# Patient Record
Sex: MALE | Race: WHITE | URBAN - METROPOLITAN AREA
[De-identification: names, ages, dates, MRNs, and addresses within clinical notes are randomized per-mention and may not be internally consistent; named-entity substitution may affect disease eponyms.]

---

## 2021-02-01 NOTE — PROGRESS NOTES
"ASSESSMENT AND PLAN:     COUNSELING:   Reviewed preventive health counseling, as reflected in patient instructions       Regular exercise       Healthy diet/nutrition       Consider Hep C screening for all patients one time for ages 18-79 years    (Z00.00) Visit for well [place] health check  (primary encounter diagnosis)  Comment: Age appropriate screening and counseling provided as above.   Plan: Hepatitis C antibody, Hemoglobin A1c ([place]), Lipid Profile, CANCELED: Lipid Panel         ([facility])          (F41.9) Anxiety  Comment: Intermittently symptomatic with anxiety. Historically well controlled with PRN propranolol for physiologic manifestations of anxiety. Will refill this. Reviewed potential side effects of beta blockade.   Plan: propranolol (INDERAL) 20 MG tablet          (Z82.49) Family history of premature coronary heart disease  Comment: Extensive family history of vascular disease including premature vascular disease in both parents and one grandparent. Checking Lp(a). If elevated or if LDL >160, would plan for preventive cardiology referral. Otherwise, plan to continue healthy lifestyle to 40 and then consider advanced testing like CAC scoring.   Plan: Lipoprotein (a),          Reggie Shelley MD  [facility]  02/04/2021, 9:20 AM      SUBJECTIVE:   Aram is a 30 year old male who presents to clinic today to establish care and for annual wellness exam.    # Vitamin D Deficiency  - diagnosed in grad school  - had repeat which was normal    # Thyroid Disorder  - told labs were borderline in grad school, unsure if hypo/hyper  - 9/18/19 TSH 2.07    # Anxiety   - more \"acute anxiety\" than \"generalized anxiety\"  - doesn't get panic attacks but has large fluctuations in degree of anxiety  - has seen psychiatry in the past and tried a couple different SSRIs   - has had propranolol as needed in the past which helped \"lower his baseline\" anxiety and found it effective  - tolerated propranolol " well without side effects  - would take 1-2 times a week during more stressful periods of work    - history of ADD, was previously on Strattera in grad school, stopped when he graduated 2018    # Acid Reflux  - comes and goes depending on diet and stress  - takes Tums if symptomatic  - sometimes Prevacid OTC with flare ups  - no difficulty swallowing, no bloody or melanotic stools  - burning pain radiating into chest, belching, gas pains  - has had endoscopy in the past which shows changes of acid reflux but no clear diagnosis  - has lost 40lbs since grad school     # Health Maintenance  - HIV Screenin2018 patient reported, negative  - Hep C Screening: do today  - BP:   BP Readings from Last 3 Encounters:   21 129/88   - Cholesterol: do today, never done before  - Diabetes Screening: do today  - Exercise: a couple of weeks ago restarted running 3-5 miles (25-40 minutes), 5 days a week. Last Spring to September was running this often. Doing high-intensity core exercise as well    Today's PHQ-2 Score:   PHQ-2 (  [employer]) 2021   Q1: Little interest or pleasure in doing things 0   Q2: Feeling down, depressed or hopeless 1   PHQ-2 Score 1     Review of Systems:   Constitutional - no fevers, chills, night sweats, unintentional weight loss/gain   Eyes - no vision concerns   Ears/Nose/Throat - no hearing concerns, no dysphagia/odynophagia   Cardiovascular - no chest pain, palpitations   Pulmonary - no shortness of breath, wheezing, coughing   GI - no abdominal pain, constipation, diarrhea, nausea, vomiting    - no dysuria, polyuria, hematuria   Musculoskeletal - no joint or muscle pain  Integument - no rash   Neuro - no weakness, numbness, paresthesia   Heme - no easy bruising/bleeding   Endocrine - no polyuria, weight loss/gain, dry skin, excessive sweating, hair loss   Psychiatric - as above   Allergic/Immunologic - no history of anaphylaxis, no history of recurrent infections    Past  "Medical History:   Diagnosis Date     Anxiety      Attention deficit hyperactivity disorder (ADHD), predominantly inattentive type      History reviewed. No pertinent surgical history.  Family History   Problem Relation Age of Onset     Cerebrovascular Disease Mother 48     Hypertension Mother      Coronary Artery Disease Father 51     Atrial fibrillation Father 54     Hyperlipidemia Father      Hypertension Father      Asthma Sister      Thyroid Disease Sister      Kidney Cancer Brother         as a child     Congenital heart disease Brother         septal defect (unsure ASD/VSD)     Thyroid Disease Maternal Grandmother      Ovarian Cancer Maternal Grandmother      Coronary Artery Disease Maternal Grandfather 72        smoked     Diabetes Maternal Grandfather      Coronary Artery Disease Paternal Grandfather 55        smoked     Diabetes Paternal Grandfather      Coronary Artery Disease Paternal Uncle 68        smokes     Prostate Cancer No family hx of      Colon Cancer No family hx of      Social History     Tobacco Use     Smoking status: Never Smoker     Smokeless tobacco: Never Used   Substance Use Topics     Alcohol use: Yes     Frequency: 2-4 times a month     Drinks per session: 3 or 4     Binge frequency: Less than monthly     Comment: 2-3 drinks 1-2 times a week     Drug use: Not Currently     Social History     Social History Narrative    Lives with partner who is traveling nurse    Does quantitative analysis for [employer]    3 cats       Current Outpatient Medications   Medication     LANsoprazole (PREVACID) 15 MG DR capsule     propranolol (INDERAL) 20 MG tablet     No current facility-administered medications for this visit.      I have reviewed the patient's past medical, surgical, family, and social history.     OBJECTIVE:   /88   Pulse 81   Temp 98.2  F (36.8  C) (Temporal)   Resp 18   Ht 1.79 m (5' 10.47\")   Wt 84.4 kg (186 lb)   SpO2 95%   BMI 26.33 kg/m      Constitutional: " well-appearing, appears stated age  Eyes: conjunctivae without erythema, sclera anicteric. Pupils equal, round, and reactive to light.   ENT: oropharynx clear, TM grey bilateral  Cardiac: regular rate and rhythm, normal S1/S2, no murmur/rubs/gallops  Respiratory: lungs clear to auscultation bilaterally, normal work of breathing, no wheezes/crackles  GI: abdomen soft, non-tender, non-distended  Extremities: warm and well perfused, radial pulses 2+ and equal, cap refill brisk.  Lymph: no cervical or supraclavicular lymphadenopathy  Skin: no rashes, lesions, or wounds  Psych: affect is full and appropriate, speech is fluent and non-pressured

## 2021-02-02 ENCOUNTER — OFFICE VISIT (OUTPATIENT)
Dept: FAMILY MEDICINE | Facility: CLINIC | Age: 31
End: 2021-02-02
Payer: COMMERCIAL

## 2021-02-02 VITALS
HEART RATE: 81 BPM | OXYGEN SATURATION: 95 % | TEMPERATURE: 98.2 F | BODY MASS INDEX: 26.63 KG/M2 | DIASTOLIC BLOOD PRESSURE: 88 MMHG | SYSTOLIC BLOOD PRESSURE: 129 MMHG | WEIGHT: 186 LBS | RESPIRATION RATE: 18 BRPM | HEIGHT: 70 IN

## 2021-02-02 DIAGNOSIS — Z82.49 FAMILY HISTORY OF PREMATURE CORONARY HEART DISEASE: ICD-10-CM

## 2021-02-02 DIAGNOSIS — Z00.00 VISIT FOR WELL MAN HEALTH CHECK: Primary | ICD-10-CM

## 2021-02-02 DIAGNOSIS — F41.9 ANXIETY: ICD-10-CM

## 2021-02-02 LAB
CHOLEST SERPL-MCNC: 139 MG/DL
HBA1C MFR BLD: 5.2 % (ref 4.1–5.7)
HCV AB SERPL QL IA: NONREACTIVE
HDLC SERPL-MCNC: 56 MG/DL
LDLC SERPL CALC-MCNC: 66 MG/DL
NONHDLC SERPL-MCNC: 83 MG/DL
TRIGL SERPL-MCNC: 85 MG/DL

## 2021-02-02 RX ORDER — PROPRANOLOL HYDROCHLORIDE 20 MG/1
TABLET ORAL
COMMUNITY
Start: 2019-09-18 | End: 2021-02-02

## 2021-02-02 RX ORDER — MECOBALAMIN 5000 MCG
15 TABLET,DISINTEGRATING ORAL DAILY
COMMUNITY
End: 2022-10-22

## 2021-02-02 RX ORDER — PROPRANOLOL HYDROCHLORIDE 20 MG/1
TABLET ORAL
Qty: 30 TABLET | Refills: 3 | Status: SHIPPED | OUTPATIENT
Start: 2021-02-02

## 2021-02-02 SDOH — HEALTH STABILITY: MENTAL HEALTH: HOW OFTEN DO YOU HAVE 6 OR MORE DRINKS ON ONE OCCASION?: LESS THAN MONTHLY

## 2021-02-02 SDOH — HEALTH STABILITY: MENTAL HEALTH: HOW MANY STANDARD DRINKS CONTAINING ALCOHOL DO YOU HAVE ON A TYPICAL DAY?: 3 OR 4

## 2021-02-02 SDOH — HEALTH STABILITY: MENTAL HEALTH: HOW OFTEN DO YOU HAVE A DRINK CONTAINING ALCOHOL?: 2-4 TIMES A MONTH

## 2021-02-02 ASSESSMENT — MIFFLIN-ST. JEOR: SCORE: 1807.43

## 2021-02-02 NOTE — NURSING NOTE
"30 year old  Chief Complaint   Patient presents with     Physical     30 yrs old non fasting        Blood pressure (!) 132/92, pulse 81, temperature 98.2  F (36.8  C), temperature source Temporal, resp. rate 18, height 1.79 m (5' 10.47\"), weight 84.4 kg (186 lb), SpO2 95 %. Body mass index is 26.33 kg/m .  There is no problem list on file for this patient.      Wt Readings from Last 2 Encounters:   02/02/21 84.4 kg (186 lb)     BP Readings from Last 3 Encounters:   02/02/21 (!) 132/92         Current Outpatient Medications   Medication     propranolol (INDERAL) 20 MG tablet     No current facility-administered medications for this visit.        Social History     Tobacco Use     Smoking status: Never Smoker     Smokeless tobacco: Never Used   Substance Use Topics     Alcohol use: Yes     Frequency: Monthly or less     Drug use: Not Currently       Health Maintenance Due   Topic Date Due     PREVENTIVE CARE VISIT  1988     HIV SCREENING  12/07/2003     HEPATITIS C SCREENING  12/07/2006     PHQ-2  01/01/2021       No results found for: PAP      February 2, 2021 4:01 PM  "

## 2021-02-04 LAB — LPA SERPL-MCNC: <6 MG/DL

## 2021-03-22 ENCOUNTER — OFFICE VISIT (OUTPATIENT)
Dept: FAMILY MEDICINE | Facility: CLINIC | Age: 31
End: 2021-03-22
Payer: COMMERCIAL

## 2021-03-22 VITALS
WEIGHT: 190.8 LBS | SYSTOLIC BLOOD PRESSURE: 141 MMHG | TEMPERATURE: 97.1 F | OXYGEN SATURATION: 98 % | RESPIRATION RATE: 15 BRPM | HEART RATE: 65 BPM | DIASTOLIC BLOOD PRESSURE: 91 MMHG | HEIGHT: 70 IN | BODY MASS INDEX: 27.32 KG/M2

## 2021-03-22 DIAGNOSIS — M25.562 LEFT KNEE PAIN, UNSPECIFIED CHRONICITY: Primary | ICD-10-CM

## 2021-03-22 DIAGNOSIS — M25.562 LEFT KNEE PAIN, UNSPECIFIED CHRONICITY: ICD-10-CM

## 2021-03-22 ASSESSMENT — PAIN SCALES - GENERAL: PAINLEVEL: MILD PAIN (3)

## 2021-03-22 ASSESSMENT — MIFFLIN-ST. JEOR: SCORE: 1829.2

## 2021-03-22 NOTE — NURSING NOTE
"30 year old  Chief Complaint   Patient presents with     Knee Pain     left knee pain. Pt injuried his knee about a month ago, his knee was swollen for a few weeks. Pt started running last week and is having issues.       Blood pressure (!) 141/91, pulse 65, temperature 97.1  F (36.2  C), resp. rate 15, height 1.79 m (5' 10.47\"), weight 86.5 kg (190 lb 12.8 oz), SpO2 98 %. Body mass index is 27.01 kg/m .  Patient Active Problem List   Diagnosis     Anxiety     Family history of premature coronary heart disease       Wt Readings from Last 2 Encounters:   03/22/21 86.5 kg (190 lb 12.8 oz)   02/02/21 84.4 kg (186 lb)     BP Readings from Last 3 Encounters:   03/22/21 (!) 141/91   02/02/21 129/88         Current Outpatient Medications   Medication     LANsoprazole (PREVACID) 15 MG DR capsule     propranolol (INDERAL) 20 MG tablet     No current facility-administered medications for this visit.        Social History     Tobacco Use     Smoking status: Never Smoker     Smokeless tobacco: Never Used   Substance Use Topics     Alcohol use: Yes     Frequency: 2-4 times a month     Drinks per session: 3 or 4     Binge frequency: Less than monthly     Comment: 2-3 drinks 1-2 times a week     Drug use: Not Currently       Health Maintenance Due   Topic Date Due     ADVANCE CARE PLANNING  Never done       No results found for: PAP      March 22, 2021 2:18 PM  "

## 2021-03-22 NOTE — PATIENT INSTRUCTIONS
ASSESSMENT/PLAN:    LEFT knee pain, atypical injury and symptoms. Likely non-specific strain. Small chance of ACL tear  1. Reviewed x-rays. Await official radiology interpretation  2. Discussed options. Elected to start with P.T.   3. If no improvement after a few sessions, next step may be further imaging  4. Feel free to contact me as needed.    5. Also, work on hamstring and hip abductor strength    --Landon Cox MD

## 2021-03-22 NOTE — PROGRESS NOTES
OVERVIEW: Aram Hadley is a 30 year old male who presents to [facility] today for Knee Pain (left knee pain. Pt injuried his knee about a month ago, his knee was swollen for a few weeks. Pt started running last week and is having issues.)        ASSESSMENT/PLAN:    LEFT knee pain, atypical injury and symptoms. Likely non-specific strain. Small chance of ACL tear  1. Reviewed x-rays. Await official radiology interpretation  2. Discussed options. Elected to start with P.T.   3. If no improvement after a few sessions, next step may be further imaging  4. Feel free to contact me as needed.    5. Also, work on hamstring and hip abductor strength    --Landon Cox MD      SUBJECTIVE:      LEFT knee pain for past 5 weeks. Was doing body weights and running. During squats or lunges, felt hyperextension. Had pain and swelling. Swelling began about 2 hours after the hyperextension.     Rested and swelling resolved after about 4 weeks. Now, getting pains in his quad and hip. Knee feels weak and bruised. Feels some instability.      He had gained weight in grad school and then lost weight with core strengthening and running.     Review Of Systems:    Has otherwise been in usual state of health, e.g.   Cardiovascular: negative  Respiratory: No shortness of breath, dyspnea on exertion, cough, or hemoptysis  Gastrointestinal: negative  Genitourinary: negative    Problem list per EMR:  Patient Active Problem List   Diagnosis     Anxiety     Family history of premature coronary heart disease       Current Outpatient Medications   Medication Sig Dispense Refill     LANsoprazole (PREVACID) 15 MG DR capsule Take 15 mg by mouth daily       propranolol (INDERAL) 20 MG tablet Take one tablet by mouth as needed for anxiety. May repeat once in a day. 30 tablet 3       No Known Allergies     Social:   From [place], then [place], [place] and then [place] in Jan 2019 with job at [employer]. Now works for [employer].  "Trained in Quantitative Economics at [employer]        OBJECTIVE    Vitals: BP (!) 141/91   Pulse 65   Temp 97.1  F (36.2  C)   Resp 15   Ht 1.79 m (5' 10.47\")   Wt 86.5 kg (190 lb 12.8 oz)   SpO2 98%   BMI 27.01 kg/m    BMI= Body mass index is 27.01 kg/m .  Very pleasant and physically fit 30-year-old in no distress.  His gait appears normal.    Left knee has no redness warmth or effusion.  2 areas of tenderness: The first being at the medial patello-femoral facet without any apprehension with patellar mobility.  The second is in the lateral joint line posteriorly.    He can actively extend his knee fully and flex to approximately 135 degrees.    Lachman's test is equivocal.  He has a large leg and the exam is somewhat difficult.  There appears to be about 5 mm of excessive laxity as compared to the unaffected side, with an occasionally felt endpoint.  No kurt instability.  MCL LCL and PCL appear intact.  Hip has full pain-free range of motion.    X-rays 3 views left knee:    No bony abnormalities noted.  Normal joint spaces.    SEE TOP OF NOTE FOR ASSESSMENT AND PLAN    --Landon Cox MD  [facility], Department of Family Medicine and Community Health  "

## 2021-03-29 ENCOUNTER — THERAPY VISIT (OUTPATIENT)
Dept: PHYSICAL THERAPY | Facility: CLINIC | Age: 31
End: 2021-03-29
Payer: COMMERCIAL

## 2021-03-29 DIAGNOSIS — M25.562 LEFT KNEE PAIN, UNSPECIFIED CHRONICITY: ICD-10-CM

## 2021-03-29 PROCEDURE — 97161 PT EVAL LOW COMPLEX 20 MIN: CPT | Mod: GP | Performed by: PHYSICAL THERAPIST

## 2021-03-29 PROCEDURE — 97110 THERAPEUTIC EXERCISES: CPT | Mod: GP | Performed by: PHYSICAL THERAPIST

## 2021-03-29 PROCEDURE — 97530 THERAPEUTIC ACTIVITIES: CPT | Mod: GP | Performed by: PHYSICAL THERAPIST

## 2021-03-29 ASSESSMENT — ACTIVITIES OF DAILY LIVING (ADL)
HOW_WOULD_YOU_RATE_THE_CURRENT_FUNCTION_OF_YOUR_KNEE_DURING_YOUR_USUAL_DAILY_ACTIVITIES_ON_A_SCALE_FROM_0_TO_100_WITH_100_BEING_YOUR_LEVEL_OF_KNEE_FUNCTION_PRIOR_TO_YOUR_INJURY_AND_0_BEING_THE_INABILITY_TO_PERFORM_ANY_OF_YOUR_USUAL_DAILY_ACTIVITIES?: 75
AS_A_RESULT_OF_YOUR_KNEE_INJURY,_HOW_WOULD_YOU_RATE_YOUR_CURRENT_LEVEL_OF_DAILY_ACTIVITY?: ABNORMAL
STAND: ACTIVITY IS NOT DIFFICULT
WALK: ACTIVITY IS MINIMALLY DIFFICULT
KNEEL ON THE FRONT OF YOUR KNEE: ACTIVITY IS SOMEWHAT DIFFICULT
LIMPING: I DO NOT HAVE THE SYMPTOM
STIFFNESS: THE SYMPTOM AFFECTS MY ACTIVITY SLIGHTLY
SWELLING: NOT ANSWERED
GO DOWN STAIRS: ACTIVITY IS MINIMALLY DIFFICULT
KNEE_ACTIVITY_OF_DAILY_LIVING_SUM: 41
HOW_WOULD_YOU_RATE_THE_OVERALL_FUNCTION_OF_YOUR_KNEE_DURING_YOUR_USUAL_DAILY_ACTIVITIES?: NEARLY NORMAL
SIT WITH YOUR KNEE BENT: NOT ANSWERED
RISE FROM A CHAIR: ACTIVITY IS MINIMALLY DIFFICULT
GO UP STAIRS: ACTIVITY IS MINIMALLY DIFFICULT
SQUAT: ACTIVITY IS SOMEWHAT DIFFICULT
GIVING WAY, BUCKLING OR SHIFTING OF KNEE: THE SYMPTOM AFFECTS MY ACTIVITY MODERATELY
PAIN: THE SYMPTOM AFFECTS MY ACTIVITY SLIGHTLY
WEAKNESS: THE SYMPTOM AFFECTS MY ACTIVITY MODERATELY

## 2021-03-29 NOTE — PROGRESS NOTES
Physical Therapy Initial Evaluation  Subjective:  The history is provided by the patient. No  was used.   Therapist Generated HPI Evaluation  Problem details: HPI: Hyperextended knee during core workout, did ice and taping, but still feels unstable and feels like muscles are affected. Has not been able to resume running. Did have swelling, this has decreased    Onset: 6 weeks ago    Pain: worst at 4/10 in knee, groin/hip up to 6/10    Worse: squatting, kneeling, running, lunges, instability going up stairs    Better: taping, advil, icing    PMH: none    Exercise: none currently; has done some walking    Occupation: -[employer] job    Goals: return to activity, gain strength    .         Type of problem:  Left knee.    This is a new condition.  Condition occurred with:  Other reason (hyperextension during workout).  Where condition occurred: during recreation/sport.  Patient reports pain:  Medial.  and is intermittent.  Pain radiates to:  Hip and other (groin).   Since onset symptoms are gradually improving.  Associated symptoms:  Buckling/giving out, loss of strength and edema. Symptoms are exacerbated by ascending stairs, kneeling, running and bending/squatting  and relieved by ice, NSAID's and bracing/immobilizing.  Special tests included:  X-ray (negative).    Restrictions due to condition include:  Working in normal job without restrictions.  Barriers include:  None as reported by patient.                        Objective:    Gait:      Deviations:  Knee:  Excessive extension L and excessive extension RGeneral Deviations:  Toe out R and toe out L                                                 Hip Evaluation    Hip Strength:    Flexion:   Left: 4+/5   Pain:  Right: 4+/5   Pain:                      Abduction:  Left: 3+/5     Pain:Right: 4+/5    Pain:  Adduction:  Left: 4/5   +   Pain:Right: 5/5   Pain:                         Knee Evaluation:  ROM:  AROM: normal       PROM    Hyperextension: Left: 9    Right:  5          Strength:     Extension:  Left: 4+/5    Pain:+      Right: 5/5    Pain:-        Ligament Testing:  Ligament testing knee: more general mobility on left side.          Anterior Drawer:  Left:  Neg    Right:  Neg    Lachman's:  Left:  Neg  Right:  Neg    Palpation:    Left knee tenderness present at:  Medial Joint Line        Functional Testing:          Quad:    Single Leg Squat:  Left:       Moderate loss of control  Right:       Mild loss of control  Bilateral Leg Squat:  WNL               General     ROS    Assessment/Plan:    Patient is a 30 year old male with left side knee complaints.    Patient has the following significant findings with corresponding treatment plan.                Diagnosis 1:  Left knee pain  Pain -  hot/cold therapy, manual therapy, splint/taping/bracing/orthotics, self management, education and home program  Impaired muscle performance - neuro re-education and home program  Decreased function - therapeutic activities and home program  Instability -  Therapeutic Activity  Therapeutic Exercise  Splinting/Taping/Bracing/Orthotic  home program    Therapy Evaluation Codes:   1) History comprised of:   Personal factors that impact the plan of care:      None.    Comorbidity factors that impact the plan of care are:      None.     Medications impacting care: None.  2) Examination of Body Systems comprised of:   Body structures and functions that impact the plan of care:      Hip and Knee.   Activity limitations that impact the plan of care are:      Jumping, Running, Squatting/kneeling and Stairs.  3) Clinical presentation characteristics are:   Stable/Uncomplicated.  4) Decision-Making    Low complexity using standardized patient assessment instrument and/or measureable assessment of functional outcome.  Cumulative Therapy Evaluation is: Low complexity.    Previous and current functional limitations:  (See Goal Flow Sheet for this  information)    Short term and Long term goals: (See Goal Flow Sheet for this information)     Communication ability:  Patient appears to be able to clearly communicate and understand verbal and written communication and follow directions correctly.  Treatment Explanation - The following has been discussed with the patient:   RX ordered/plan of care  Anticipated outcomes  Possible risks and side effects  This patient would benefit from PT intervention to resume normal activities.   Rehab potential is excellent.    Frequency:  1 X a month, once daily  Duration:  for 4 months  Discharge Plan:  Achieve all LTG.  Independent in home treatment program.  Reach maximal therapeutic benefit.    Please refer to the daily flowsheet for treatment today, total treatment time and time spent performing 1:1 timed codes.

## 2021-03-30 NOTE — PROGRESS NOTES
Physical Therapy Initial Evaluation  Subjective:    Patient Health History           General health as reported by patient is fair.  Pertinent medical history includes: none.   Red flags:  None as reported by patient.         Current medications:  Anti-inflammatory.    Current occupation is .   Primary job tasks include:  Computer work and prolonged sitting.                                    Objective:  System    Physical Exam    General     ROS    Assessment/Plan:

## 2021-04-22 ENCOUNTER — THERAPY VISIT (OUTPATIENT)
Dept: PHYSICAL THERAPY | Facility: CLINIC | Age: 31
End: 2021-04-22
Payer: COMMERCIAL

## 2021-04-22 DIAGNOSIS — M25.562 LEFT KNEE PAIN, UNSPECIFIED CHRONICITY: Primary | ICD-10-CM

## 2021-04-22 PROCEDURE — 97110 THERAPEUTIC EXERCISES: CPT | Mod: GP | Performed by: PHYSICAL THERAPIST

## 2021-04-22 ASSESSMENT — ACTIVITIES OF DAILY LIVING (ADL)
WALK: ACTIVITY IS NOT DIFFICULT
STAND: ACTIVITY IS NOT DIFFICULT
SWELLING: I DO NOT HAVE THE SYMPTOM
HOW_WOULD_YOU_RATE_THE_CURRENT_FUNCTION_OF_YOUR_KNEE_DURING_YOUR_USUAL_DAILY_ACTIVITIES_ON_A_SCALE_FROM_0_TO_100_WITH_100_BEING_YOUR_LEVEL_OF_KNEE_FUNCTION_PRIOR_TO_YOUR_INJURY_AND_0_BEING_THE_INABILITY_TO_PERFORM_ANY_OF_YOUR_USUAL_DAILY_ACTIVITIES?: 80
SQUAT: ACTIVITY IS SOMEWHAT DIFFICULT
GO UP STAIRS: ACTIVITY IS NOT DIFFICULT
AS_A_RESULT_OF_YOUR_KNEE_INJURY,_HOW_WOULD_YOU_RATE_YOUR_CURRENT_LEVEL_OF_DAILY_ACTIVITY?: ABNORMAL
LIMPING: I DO NOT HAVE THE SYMPTOM
WEAKNESS: THE SYMPTOM AFFECTS MY ACTIVITY SLIGHTLY
PAIN: THE SYMPTOM AFFECTS MY ACTIVITY SLIGHTLY
RISE FROM A CHAIR: ACTIVITY IS NOT DIFFICULT
GIVING WAY, BUCKLING OR SHIFTING OF KNEE: THE SYMPTOM AFFECTS MY ACTIVITY SLIGHTLY
GO DOWN STAIRS: ACTIVITY IS NOT DIFFICULT
HOW_WOULD_YOU_RATE_THE_OVERALL_FUNCTION_OF_YOUR_KNEE_DURING_YOUR_USUAL_DAILY_ACTIVITIES?: NEARLY NORMAL
SIT WITH YOUR KNEE BENT: ACTIVITY IS NOT DIFFICULT
STIFFNESS: THE SYMPTOM AFFECTS MY ACTIVITY SLIGHTLY

## 2021-04-28 NOTE — PROGRESS NOTES
"ASSESSMENT AND PLAN:     (R19.8) Irregular bowel habits  (primary encounter diagnosis)  Comment: History consistent with IBS but hasn't been going on long enough yet to meet Rosalio IV criteria for diagnosis. Basic labs to rule out alternative diagnoses. Deferring fecal calprotectin for now. Trial of low FODMAP diet - provided with informational handout. If not improving over next month or so, will plan for calprotectin.   Plan: CBC with Diff Plt ([facility]), TSH with free T4         reflex, IgA, Tissue transglutaminase antibody         IgA          (M62.830) Back muscle spasm  Comment: Recurrence of upper back/neck strain. No red flag features by history. Has had benefit from muscle relaxers in the past. Prescribed flexeril. Asked for message if not improving in next 1-2 weeks like he typically does and would send to PT.   Plan: cyclobenzaprine (FLEXERIL) 5 MG tablet          Reggie Shelley MD   [facility]  04/29/2021, 1:11 PM      SUBJECTIVE:   Aram is a 30 year old male who presents to clinic today for a return visit.    # Back Pain  - last Thursday, in the shower, turned his head, and back \"clamped up\"   - giving trouble sleeping  - pain when turning head to the left    Location: left upper back, posterior shoulder, and neck  Duration: about 7 days, every 4-5 years will have a \"back spasm\", lasts 1-2 weeks and then resolves  Radiation: no  Numbness/ Tingling? no  Weakness? no  Trauma? no  Red flags? No history of cancer, unexplained weight loss, bladder or bowel incontinence, bladder retention, prolonged steroid use, fever  Meds tried? Icing, rubbing, Tramadol (leftover from previous episode), Ibuprofen 400mg twice a day    # Constipation/Cramping  - had \"gas issues\", belching, reflux since younger  - had endoscopy when younger, told he had \"mild GERD\"  - was on nexium previously but didn't seem to help so stopped  - reflux comes and goes, tends to be better when eating better and exercising more    - " "bowel movements have been irregular for the past 2 months  - will have 2 days when he feels like he needs to have a bowel movement, but can't or will have to bear down  - other times will go 3 times in a day, loose stools  - doesn't seem to ever be regular any more  - has had similar episodes like this in the past, but shorter duration than the 2 months this has been going on  - has had some gradual decrease in exercise in recent months due to knee pain  - no recent dietary changes  - has not noticed any dietary triggers for BM changes  - fattier foods due seem to irritated things    - has sensation of bloating in abdomen about 60% of the time  - past couple of weeks has had \"dull pain\" on right side of abdomen, lasts a couple of hours, happens most days  - abdominal discomforts can get better for a while after a bowel movement    - no melanotic stools or blood in stools  - no changes in appetite    Wt Readings from Last 6 Encounters:   04/29/21 86.8 kg (191 lb 4 oz)   03/22/21 86.5 kg (190 lb 12.8 oz)   02/02/21 84.4 kg (186 lb)     ROS: Denies fevers, chills, chest pain, difficulty breathing    Patient Active Problem List   Diagnosis     Anxiety     Family history of premature coronary heart disease     Left knee pain, unspecified chronicity     Current Outpatient Medications   Medication     cyclobenzaprine (FLEXERIL) 5 MG tablet     LANsoprazole (PREVACID) 15 MG DR capsule     propranolol (INDERAL) 20 MG tablet     No current facility-administered medications for this visit.        I have reviewed the patient's relevant past medical history.     OBJECTIVE:   /67 (BP Location: Left arm, Patient Position: Sitting, Cuff Size: Adult Regular)   Pulse 66   Temp 97.4  F (36.3  C) (Skin)   Resp 15   Ht 1.816 m (5' 11.5\")   Wt 86.8 kg (191 lb 4 oz)   SpO2 95%   BMI 26.30 kg/m      Constitutional: well-appearing, appears stated age  Eyes: conjunctivae without erythema, sclera anicteric.   Abdomen: normal " bowel sounds, non-distended. TTP in right-mid and right lower abdomen. Tenderness worsens when flexing rectus.   Skin: no rashes, lesions, or wounds  Psych: affect is full and appropriate, speech is fluent and non-pressured    Neck: full active ROM

## 2021-04-29 ENCOUNTER — OFFICE VISIT (OUTPATIENT)
Dept: FAMILY MEDICINE | Facility: CLINIC | Age: 31
End: 2021-04-29
Payer: COMMERCIAL

## 2021-04-29 VITALS
BODY MASS INDEX: 25.9 KG/M2 | RESPIRATION RATE: 15 BRPM | WEIGHT: 191.25 LBS | DIASTOLIC BLOOD PRESSURE: 67 MMHG | SYSTOLIC BLOOD PRESSURE: 110 MMHG | TEMPERATURE: 97.4 F | HEART RATE: 66 BPM | OXYGEN SATURATION: 95 % | HEIGHT: 72 IN

## 2021-04-29 DIAGNOSIS — R19.8 IRREGULAR BOWEL HABITS: Primary | ICD-10-CM

## 2021-04-29 DIAGNOSIS — M62.830 BACK MUSCLE SPASM: ICD-10-CM

## 2021-04-29 LAB
% GRANULOCYTES: 40.4 %G (ref 40–75)
ERYTHROCYTE [DISTWIDTH] IN BLOOD BY AUTOMATED COUNT: 12.2 %
GRANULOCYTES #: 2.2 K/UL (ref 1.6–8.3)
HCT VFR BLD AUTO: 44.9 % (ref 40–53)
HEMOGLOBIN: 14.7 G/DL (ref 13.3–17.7)
LYMPHOCYTES # BLD AUTO: 2.6 K/UL (ref 0.8–5.3)
LYMPHOCYTES NFR BLD AUTO: 46.8 %L (ref 20–48)
MCH RBC QN AUTO: 28.2 PG (ref 26.5–35)
MCHC RBC AUTO-ENTMCNC: 32.7 G/DL (ref 32–36)
MCV RBC AUTO: 86.2 FL (ref 78–100)
MID #: 0.7 K/UL (ref 0–2.2)
MID %: 12.8 %M (ref 0–20)
PLATELET # BLD AUTO: 242 K/UL (ref 150–450)
RBC # BLD AUTO: 5.21 M/UL (ref 4.4–5.9)
TSH SERPL DL<=0.005 MIU/L-ACNC: 3.9 MU/L (ref 0.4–4)
WBC # BLD AUTO: 5.5 K/UL (ref 4–11)

## 2021-04-29 RX ORDER — CYCLOBENZAPRINE HCL 5 MG
5-10 TABLET ORAL 3 TIMES DAILY PRN
Qty: 40 TABLET | Refills: 0 | Status: SHIPPED | OUTPATIENT
Start: 2021-04-29 | End: 2022-10-22

## 2021-04-29 ASSESSMENT — MIFFLIN-ST. JEOR: SCORE: 1847.56

## 2021-04-29 NOTE — NURSING NOTE
"30 year old  Chief Complaint   Patient presents with     Gastrointestinal Problem     gas, constipation and cramping, pain in right side, never regular bowel, x few months     Back Pain     back spasm this last week, upper left side to neck       Blood pressure (!) 134/92, pulse 65, temperature 97.4  F (36.3  C), temperature source Skin, resp. rate 15, height 1.816 m (5' 11.5\"), weight 86.8 kg (191 lb 4 oz), SpO2 95 %. Body mass index is 26.3 kg/m .  Patient Active Problem List   Diagnosis     Anxiety     Family history of premature coronary heart disease     Left knee pain, unspecified chronicity       Wt Readings from Last 2 Encounters:   04/29/21 86.8 kg (191 lb 4 oz)   03/22/21 86.5 kg (190 lb 12.8 oz)     BP Readings from Last 3 Encounters:   04/29/21 (!) 134/92   03/22/21 (!) 141/91   02/02/21 129/88         Current Outpatient Medications   Medication     LANsoprazole (PREVACID) 15 MG DR capsule     propranolol (INDERAL) 20 MG tablet     No current facility-administered medications for this visit.        Social History     Tobacco Use     Smoking status: Never Smoker     Smokeless tobacco: Never Used   Substance Use Topics     Alcohol use: Yes     Frequency: 2-4 times a month     Drinks per session: 3 or 4     Binge frequency: Less than monthly     Comment: 2-3 drinks 1-2 times a week     Drug use: Not Currently       Health Maintenance Due   Topic Date Due     ADVANCE CARE PLANNING  Never done       No results found for: PAP      April 29, 2021 7:59 AM    "

## 2021-04-30 LAB
IGA SERPL-MCNC: 230 MG/DL (ref 84–499)
TTG IGA SER-ACNC: 3 U/ML

## 2021-06-03 NOTE — PROGRESS NOTES
Subjective:  HPI  Physical Exam                    Objective:  System    Physical Exam    General     ROS    Assessment/Plan:    DISCHARGE REPORT    Progress reporting period is from 3/29/2021 to 4/22/2021.       SUBJECTIVE  Subjective changes noted by patient:  .  Subjective: Knee feels better, still feels a little unstable and weak. Biking feels fine.    Current pain level is  Current Pain level: 0/10.     Previous pain level was   Initial Pain level: 6/10.   Changes in function:  Yes (See Goal flowsheet attached for changes in current functional level)  Adverse reaction to treatment or activity: None    OBJECTIVE  Changes noted in objective findings:  Yes,   Objective: LE strength reviewed, pt demos good form on exercises; return to run discussed; running on treadmill-mild B foot pronation and glute weakness evident, no inc in pain with running     ASSESSMENT/PLAN  Updated problem list and treatment plan: Diagnosis 1:  Left knee pain  Pain -  manual therapy, self management, education and home program  Decreased ROM/flexibility - manual therapy, therapeutic exercise and home program  Decreased strength - therapeutic exercise, therapeutic activities and home program  Decreased function - therapeutic activities and home program  STG/LTGs have been met or progress has been made towards goals:  Yes (See Goal flow sheet completed today.)  Assessment of Progress: The patient's condition is improving.  The patient's condition has potential to improve.  Self Management Plans:  Patient has been instructed in a home treatment program.    Aram continues to require the following intervention to meet STG and LTG's:  Patient needs to continue to work on the home exercise program.      Recommendations:  This patient is ready to be discharged from therapy and continue their home treatment program.    Please refer to the daily flowsheet for treatment today, total treatment time and time spent performing 1:1 timed  codes.

## 2021-10-11 ENCOUNTER — HEALTH MAINTENANCE LETTER (OUTPATIENT)
Age: 31
End: 2021-10-11

## 2022-03-27 ENCOUNTER — HEALTH MAINTENANCE LETTER (OUTPATIENT)
Age: 32
End: 2022-03-27

## 2022-04-13 ENCOUNTER — OFFICE VISIT (OUTPATIENT)
Dept: FAMILY MEDICINE | Facility: CLINIC | Age: 32
End: 2022-04-13
Payer: COMMERCIAL

## 2022-04-13 ENCOUNTER — ANCILLARY PROCEDURE (OUTPATIENT)
Dept: MRI IMAGING | Facility: CLINIC | Age: 32
End: 2022-04-13
Attending: FAMILY MEDICINE
Payer: COMMERCIAL

## 2022-04-13 VITALS
SYSTOLIC BLOOD PRESSURE: 112 MMHG | BODY MASS INDEX: 23.9 KG/M2 | TEMPERATURE: 97.9 F | DIASTOLIC BLOOD PRESSURE: 73 MMHG | RESPIRATION RATE: 12 BRPM | WEIGHT: 173.75 LBS | OXYGEN SATURATION: 97 % | HEART RATE: 74 BPM

## 2022-04-13 DIAGNOSIS — M25.562 LEFT KNEE PAIN, UNSPECIFIED CHRONICITY: ICD-10-CM

## 2022-04-13 DIAGNOSIS — M25.562 LEFT KNEE PAIN, UNSPECIFIED CHRONICITY: Primary | ICD-10-CM

## 2022-04-13 LAB — URATE SERPL-MCNC: 6.7 MG/DL (ref 3.5–7.2)

## 2022-04-13 PROCEDURE — 73721 MRI JNT OF LWR EXTRE W/O DYE: CPT | Mod: LT | Performed by: RADIOLOGY

## 2022-04-13 PROCEDURE — 84550 ASSAY OF BLOOD/URIC ACID: CPT | Performed by: FAMILY MEDICINE

## 2022-04-13 NOTE — PATIENT INSTRUCTIONS
ASSESSMENT/PLAN:    LEFT knee pain with effusion, possible medial meniscal tear vs ACL deficiency with normal Lachman's test.    - Discussed options. Elected to obtain an MRI. Call [phone] or [phone] to schedule imaging tests at the [facility]'s center.   - Also, given the spontaneous nature of this effusion, will check a spot uric acid level  - Further plan pending results.     --Landon Cox MD

## 2022-04-13 NOTE — NURSING NOTE
31 year old  Chief Complaint   Patient presents with     Knee Pain     Left x couple of days       Blood pressure 112/73, pulse 74, temperature 97.9  F (36.6  C), temperature source Skin, resp. rate 12, weight 78.8 kg (173 lb 12 oz), SpO2 97 %. Body mass index is 23.9 kg/m .  Patient Active Problem List   Diagnosis     Anxiety     Family history of premature coronary heart disease     Left knee pain, unspecified chronicity       Wt Readings from Last 2 Encounters:   04/13/22 78.8 kg (173 lb 12 oz)   04/29/21 86.8 kg (191 lb 4 oz)     BP Readings from Last 3 Encounters:   04/13/22 112/73   04/29/21 110/67   03/22/21 (!) 141/91         Current Outpatient Medications   Medication     cyclobenzaprine (FLEXERIL) 5 MG tablet     LANsoprazole (PREVACID) 15 MG DR capsule     propranolol (INDERAL) 20 MG tablet     No current facility-administered medications for this visit.       Social History     Tobacco Use     Smoking status: Never Smoker     Smokeless tobacco: Never Used   Substance Use Topics     Alcohol use: Yes     Comment: 2-3 drinks 1-2 times a week     Drug use: Not Currently       Health Maintenance Due   Topic Date Due     ADVANCE CARE PLANNING  Never done     PREVENTIVE CARE VISIT  02/02/2022       No results found for: PAP      April 13, 2022 8:19 AM

## 2022-04-13 NOTE — PROGRESS NOTES
OVERVIEW: Aram Hadley is a 31 year old male who presents to [facility] today for Knee Pain (Left x couple of days)        ASSESSMENT/PLAN:    1. LEFT knee pain with effusion, possible medial meniscal tear vs ACL deficiency with normal Lachman's test.    - Discussed options. Elected to obtain an MRI. Call [phone] or [phone] to schedule imaging tests at the [facility].   - Also, given the spontaneous nature of this effusion, will check a spot uric acid level  - Further plan pending results.       --Landon Cox MD      SUBJECTIVE:   Aram presents today with a recurrence of left knee pain.  I saw him once before for left knee pain, that was 1 year and 2 weeks ago on March 22, 2021.  At that time he had a an equivocal Lachman's test.    Saw Julita Rodriguez [place] and improved slowly over time. However, then, stopped exercising and gained weight.   Since Dec 2021, he started lifting, running and riding peloton. He rebuilt his leg strength and was doing great until 3 days ago. He went for a light run and knee ached and swelled the following day. Swelling has improved.   Pain today is 3/10. Was 5-6/10.   Pain is with stairs up or down.   Can't squat or do a lunge.       Wt Readings from Last 5 Encounters:   04/13/22 78.8 kg (173 lb 12 oz)   04/29/21 86.8 kg (191 lb 4 oz)   03/22/21 86.5 kg (190 lb 12.8 oz)   02/02/21 84.4 kg (186 lb)         Review Of Systems:    Has otherwise been in usual state of health, e.g.   Cardiovascular: negative  Respiratory: No shortness of breath, dyspnea on exertion, cough, or hemoptysis  Gastrointestinal: negative  Genitourinary: negative    Problem list per EMR:  Patient Active Problem List   Diagnosis     Anxiety     Family history of premature coronary heart disease     Left knee pain, unspecified chronicity       Current Outpatient Medications   Medication Sig Dispense Refill     cyclobenzaprine (FLEXERIL) 5 MG tablet Take 1-2 tablets  (5-10 mg) by mouth 3 times daily as needed for muscle spasms (Patient not taking: Reported on 4/13/2022) 40 tablet 0     LANsoprazole (PREVACID) 15 MG DR capsule Take 15 mg by mouth daily (Patient not taking: Reported on 4/13/2022)       propranolol (INDERAL) 20 MG tablet Take one tablet by mouth as needed for anxiety. May repeat once in a day. (Patient not taking: Reported on 4/13/2022) 30 tablet 3       No Known Allergies     Social:   From [place].  Enjoys basketball.  Watched the [employer] game last night.      OBJECTIVE    Vitals: /73 (BP Location: Right arm, Patient Position: Sitting, Cuff Size: Adult Large)   Pulse 74   Temp 97.9  F (36.6  C) (Skin)   Resp 12   Wt 78.8 kg (173 lb 12 oz)   SpO2 97%   BMI 23.90 kg/m    BMI= Body mass index is 23.9 kg/m .  He appears well and in no distress.  His gait is normal.  His left knee has a mild to moderate sized effusion.  He is able to do an active straight leg raise and then flex his knee although he lacks about 5 degrees of full extension and 5 degrees of full flexion due to the effusion.    Tenderness is mainly along the medial joint line and this is increased with hyperflexion of the knee.  His Lachman's test today appears normal (it was noted that it felt equivocal last year).  LCL, MCL and PCL all appear intact.  His patella had normal mobility and no apprehension.  Hip had full range of motion.  Negative straight leg raise.  Normal distal neurovascular exam.    SEE TOP OF NOTE FOR ASSESSMENT AND PLAN    --Landon Cox MD  [facility], Department of Family Medicine and Community Health

## 2022-09-09 ENCOUNTER — OFFICE VISIT (OUTPATIENT)
Dept: FAMILY MEDICINE | Facility: CLINIC | Age: 32
End: 2022-09-09
Payer: COMMERCIAL

## 2022-09-09 DIAGNOSIS — L65.9 HAIR LOSS: ICD-10-CM

## 2022-09-09 DIAGNOSIS — L91.0 KELOID SCAR: ICD-10-CM

## 2022-09-09 DIAGNOSIS — L70.0 ACNE VULGARIS: Primary | ICD-10-CM

## 2022-09-09 PROCEDURE — 11901 INJECT SKIN LESIONS >7: CPT | Mod: GC | Performed by: DERMATOLOGY

## 2022-09-09 PROCEDURE — 99204 OFFICE O/P NEW MOD 45 MIN: CPT | Mod: 25 | Performed by: DERMATOLOGY

## 2022-09-09 RX ORDER — DOXYCYCLINE 100 MG/1
100 CAPSULE ORAL 2 TIMES DAILY
Qty: 180 CAPSULE | Refills: 0 | Status: SHIPPED | OUTPATIENT
Start: 2022-09-09 | End: 2022-12-14

## 2022-09-09 RX ORDER — MINOXIDIL 2.5 MG/1
2.5 TABLET ORAL DAILY
Qty: 90 TABLET | Refills: 0 | Status: SHIPPED | OUTPATIENT
Start: 2022-09-09 | End: 2022-12-23

## 2022-09-09 RX ORDER — TRIAMCINOLONE ACETONIDE 40 MG/ML
40 INJECTION, SUSPENSION INTRA-ARTICULAR; INTRAMUSCULAR ONCE
Status: COMPLETED | OUTPATIENT
Start: 2022-09-09 | End: 2022-09-09

## 2022-09-09 RX ADMIN — TRIAMCINOLONE ACETONIDE 40 MG: 40 INJECTION, SUSPENSION INTRA-ARTICULAR; INTRAMUSCULAR at 12:47

## 2022-09-09 ASSESSMENT — PAIN SCALES - GENERAL: PAINLEVEL: NO PAIN (0)

## 2022-09-09 NOTE — PROGRESS NOTES
[facility] Dermatology Clinic Note - EP    Encounter Date: Sep 9, 2022    Dermatology Problem List:  # Hair loss, clinically c/w with androgenetic alopecia  - oral minoxidil 2.5 mg daily  # Keloid scarring, posterior shoulder  - 1 ml ILK-40 9/9/22  # Acne, scarring, chest and back   - doxy 100 mg BID; OTC sal acid wash or sulfur wash daily  - prior tx: BPO wash (doesn't like)  - future considerations: isotretinoin    ____________________________________________    Assessment & Plan:     # Hair loss, clinically c/w with androgenetic alopecia  Has tried topical minoxdil solution and foam before but did not like the feel (felt greasy). Wanting to try oral minoxidil.   - start oral minoxidil 2.5 mg daily. Discussed side effects including postural hypotension. Discussed that if he does experience this side effect he should let us know and we can decrease the dose to 1.25 mg daily.     # Keloid scarring, posterior shoulder  Discussed treatment options including ILK and laser. Discussed that providers will be hesitant to do any laser treatments while he is still getting new lesions and if he is on isotretinoin    # Acne, scarring, chest and back   Discussed treatments options including oral antibiotics versus isotretinoin. Opted to start with antibiotics and will potentially consider isotretinoin at a future visit.   - start doxy 100 mg BID  - continue OTC sal acid wash or sulfur wash daily    Procedures Performed:   - Intra-lesional triamcinolone procedure note. After verbal consent was obtained, positioning and cleansing with isopropyl alcohol, 1.0 total mL of triamcinolone 40 mg/mL was injected into 8 lesion(s) on the shoulders. The patient tolerated the procedure well and left the dermatology clinic in good condition.    Follow-up: 3 months     Staff involved: Staff/Resident/Scribe    Scribe Disclosure:  Shahab LYON, am serving as a scribe to document services personally performed by Davis Onofre MD based on data  collection and the provider's statements to me.     Leena Garcia MD  Dermatology Resident, PGY4    Staff Physician Comments:   I saw and evaluated the patient with the resident and I agree with the assessment and plan.  I was present for the entire minor procedure and examination. I have made edits if needed.    Davis Onofre MD  Staff Dermatologist and Dermatopathologist  , Department of Dermatology   ____________________________________________    CC: Derm Problem (Keloid scarring from cystic acne and cystic acne)      HPI:  Mr. Aram Hadley is a(n) extremely pleasant 31 year old male who presents today as a new patient for evaluation of acne, scarring, and hair loss.    Occasionally get new cystic acne spots. Usually on chest and back, occasionally on face. Says he only develops the keloids when the lesions on his shoulders.   Current using sal acid wash on face and back. Occasionally will use sulfur wash. Has tried BPO wash before but feels like it made his skin more greasy.   Says he has very sensitive skin     Most bothered by keloid scarring on the backs of the shoulders. Has had injections years ago but doesn't fee like it made much of a difference   Also has some scarring on the central chest but less bothered by this    Has noticed increased hair loss over recent years. Has tried topical minoxdil solution and foam before but did not like it because it felt greasy. Wanting to possibly try oral minoxidil.      Labs Reviewed:  None    Physical Exam:  Vitals: There were no vitals taken for this visit.  SKIN: Exam of the scalp, face, neck, upper central chest, and upper central back was performed.  - decreased hair density and thinning on bitemporal and vertex scalp. No scalp scale.   - keloidal scarring on posterior shoulders   - boxcar scarring on central chest   - scattered pink papules on chest and upper back   - face is clear of new spots. Few scattered mild ice pick scars  from prior acne on cheeks and forehead   - No other lesions of concern on areas examined.     Medications:  Current Outpatient Medications   Medication     cyclobenzaprine (FLEXERIL) 5 MG tablet     LANsoprazole (PREVACID) 15 MG DR capsule     propranolol (INDERAL) 20 MG tablet     No current facility-administered medications for this visit.      Past Medical History:   Patient Active Problem List   Diagnosis     Anxiety     Family history of premature coronary heart disease     Left knee pain, unspecified chronicity     Past Medical History:   Diagnosis Date     Anxiety      Attention deficit hyperactivity disorder (ADHD), predominantly inattentive type        CC Referred Self, MD  No address on file on close of this encounter.    This note has been created using voice recognition software; while it has been reviewed, some errors may persist.

## 2022-09-09 NOTE — LETTER
9/9/2022         RE: Aram Hadley  [address]  [facility] [address]        Dear Colleague,    Thank you for referring your patient, Aram Hadley, to the [facility]. Please see a copy of my visit note below.    [facility] Dermatology Clinic Note - EP    Encounter Date: Sep 9, 2022    Dermatology Problem List:  # Hair loss, clinically c/w with androgenetic alopecia  - oral minoxidil 2.5 mg daily  # Keloid scarring, posterior shoulder  - 1 ml ILK-40 9/9/22  # Acne, scarring, chest and back   - doxy 100 mg BID; OTC sal acid wash or sulfur wash daily  - prior tx: BPO wash (doesn't like)  - future considerations: isotretinoin    ____________________________________________    Assessment & Plan:     # Hair loss, clinically c/w with androgenetic alopecia  Has tried topical minoxdil solution and foam before but did not like the feel (felt greasy). Wanting to try oral minoxidil.   - start oral minoxidil 2.5 mg daily. Discussed side effects including postural hypotension. Discussed that if he does experience this side effect he should let us know and we can decrease the dose to 1.25 mg daily.     # Keloid scarring, posterior shoulder  Discussed treatment options including ILK and laser. Discussed that providers will be hesitant to do any laser treatments while he is still getting new lesions and if he is on isotretinoin    # Acne, scarring, chest and back   Discussed treatments options including oral antibiotics versus isotretinoin. Opted to start with antibiotics and will potentially consider isotretinoin at a future visit.   - start doxy 100 mg BID  - continue OTC sal acid wash or sulfur wash daily    Procedures Performed:   - Intra-lesional triamcinolone procedure note. After verbal consent was obtained, positioning and cleansing with isopropyl alcohol, 1.0 total mL of triamcinolone 40 mg/mL was injected into 8 lesion(s) on the shoulders. The patient tolerated the procedure well and  left the dermatology clinic in good condition.    Follow-up: 3 months     Staff involved: Staff/Resident/Scribe    Scribe Disclosure:  I, Shahab Jessica, am serving as a scribe to document services personally performed by Davis Onofre MD based on data collection and the provider's statements to me.     Leena Garcia MD  Dermatology Resident, PGY4    Staff Physician Comments:   I saw and evaluated the patient with the resident and I agree with the assessment and plan.  I was present for the entire minor procedure and examination. I have made edits if needed.    Davis Onofre MD  Staff Dermatologist and Dermatopathologist  , Department of Dermatology   ____________________________________________    CC: Derm Problem (Keloid scarring from cystic acne and cystic acne)      HPI:  Mr. Aram Hadley is a(n) extremely pleasant 31 year old male who presents today as a new patient for evaluation of acne, scarring, and hair loss.    Occasionally get new cystic acne spots. Usually on chest and back, occasionally on face. Says he only develops the keloids when the lesions on his shoulders.   Current using sal acid wash on face and back. Occasionally will use sulfur wash. Has tried BPO wash before but feels like it made his skin more greasy.   Says he has very sensitive skin     Most bothered by keloid scarring on the backs of the shoulders. Has had injections years ago but doesn't fee like it made much of a difference   Also has some scarring on the central chest but less bothered by this    Has noticed increased hair loss over recent years. Has tried topical minoxdil solution and foam before but did not like it because it felt greasy. Wanting to possibly try oral minoxidil.      Labs Reviewed:  None    Physical Exam:  Vitals: There were no vitals taken for this visit.  SKIN: Exam of the scalp, face, neck, upper central chest, and upper central back was performed.  - decreased hair density and  thinning on bitemporal and vertex scalp. No scalp scale.   - keloidal scarring on posterior shoulders   - boxcar scarring on central chest   - scattered pink papules on chest and upper back   - face is clear of new spots. Few scattered mild ice pick scars from prior acne on cheeks and forehead   - No other lesions of concern on areas examined.     Medications:  Current Outpatient Medications   Medication     cyclobenzaprine (FLEXERIL) 5 MG tablet     LANsoprazole (PREVACID) 15 MG DR capsule     propranolol (INDERAL) 20 MG tablet     No current facility-administered medications for this visit.      Past Medical History:   Patient Active Problem List   Diagnosis     Anxiety     Family history of premature coronary heart disease     Left knee pain, unspecified chronicity     Past Medical History:   Diagnosis Date     Anxiety      Attention deficit hyperactivity disorder (ADHD), predominantly inattentive type        CC Referred Self, MD  No address on file on close of this encounter.    This note has been created using voice recognition software; while it has been reviewed, some errors may persist.       Again, thank you for allowing me to participate in the care of your patient.        Sincerely,        Davis Onofre MD

## 2022-09-09 NOTE — PATIENT INSTRUCTIONS
Doxycycline    Doxycycline is an antibiotic, closely related to tetracycline.  It is commonly used for acne or rosacea, and occasionally for infections, especially with Staph.    Its main side effects include gastrointestinal upset and sun sensitivity.    Take it with a full glass of fluids, so that it does not stick in the esophagus and irritate it.   Avoid lying down for a few minutes afterwards.  If it does cause nausea, most of the dose will be absorbed if it is taken with a small amount of food.  Try not to take it within an hour of milk or dairy products or of taking vitamins with calcium or iron, as they will interfere with its absorption.    Sun sensitivity means sunburning more easily while taking this drug.  This is dose-related, and sometimes we will be able to reduce the dose in the summer months, if necessary.  Wear a high-SPF sunscreen (30 or greater) for outdoor activities, trips to steven locations, or skiing.      Minoxidil  If you experience lightheadedness, decrease to 1/2 pill (1.25 mg) daily

## 2022-09-25 ENCOUNTER — HEALTH MAINTENANCE LETTER (OUTPATIENT)
Age: 32
End: 2022-09-25

## 2022-10-22 ENCOUNTER — OFFICE VISIT (OUTPATIENT)
Dept: FAMILY MEDICINE | Facility: CLINIC | Age: 32
End: 2022-10-22
Payer: COMMERCIAL

## 2022-10-22 VITALS
TEMPERATURE: 97.9 F | DIASTOLIC BLOOD PRESSURE: 98 MMHG | HEART RATE: 69 BPM | OXYGEN SATURATION: 97 % | SYSTOLIC BLOOD PRESSURE: 132 MMHG

## 2022-10-22 DIAGNOSIS — M62.830 BACK MUSCLE SPASM: Primary | ICD-10-CM

## 2022-10-22 PROCEDURE — 99213 OFFICE O/P EST LOW 20 MIN: CPT

## 2022-10-22 RX ORDER — CYCLOBENZAPRINE HCL 5 MG
5-10 TABLET ORAL 3 TIMES DAILY PRN
Qty: 20 TABLET | Refills: 0 | Status: SHIPPED | OUTPATIENT
Start: 2022-10-22 | End: 2022-11-01

## 2022-10-22 RX ORDER — DEXTROAMPHETAMINE SACCHARATE, AMPHETAMINE ASPARTATE, DEXTROAMPHETAMINE SULFATE AND AMPHETAMINE SULFATE 2.5; 2.5; 2.5; 2.5 MG/1; MG/1; MG/1; MG/1
10 TABLET ORAL EVERY EVENING
COMMUNITY
Start: 2022-10-12

## 2022-10-22 NOTE — PROGRESS NOTES
ICD-10-CM    1. Back muscle spasm  M62.830 cyclobenzaprine (FLEXERIL) 5 MG tablet        Right rhomboid region, likely related to postural strain.  No evidence of neurovascular compromise.    PLAN:  Patient Instructions   Flexeril 1-2 tablets every 8 hours as needed.  Do not drive after using this medication.    Follow up with chiropractor or consider talking to your regular doctor about a referral for physical therapy for longer-term management of this issue.    Discussed home exercise program using Pamela Mcclain's HIIT-style videos that target specific areas of the body as a good way to squeeze some activity into a busy day.    SUBJECTIVE:  Aram Hadley is a 31 year old male who presents to  today with a few days of R upper back tightness.  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  He has had issues like this in the past that respond well to Flexeril.  He has tried having his girlfriend massage the area, but she says it's very tight and the knot won't release.  No recent injuries.  No numbness, tingling, or pain going into arm.  This has been a recurrent issue for the patient.  He has not done any physical therapy for this.  He has not tried chiropractic care.    OBJECTIVE:  BP (!) 132/98   Pulse 69   Temp 97.9  F (36.6  C) (Tympanic)   SpO2 97%   GEN: well-appearing, in NAD.  Has forward head posture and rounded shoulders.  BACK: palpable spasm R rhomboid with prominent muscle knot, no bony midline tenderness  EXT: normal AROM of both shoulders, pain in area of muscle spasm with external rotation and forward flexion.  Normal strength in the arms.  Cap refill normal in hands.

## 2022-10-22 NOTE — PATIENT INSTRUCTIONS
Flexeril 1-2 tablets every 8 hours as needed.  Do not drive after using this medication.    Follow up with chiropractor or consider talking to your regular doctor about a referral for physical therapy for longer-term management of this issue.

## 2022-12-05 DIAGNOSIS — L65.9 HAIR LOSS: ICD-10-CM

## 2022-12-07 NOTE — PROGRESS NOTES
[facility] Dermatology Clinic Note - EP    Encounter Date: Dec 9, 2022    Dermatology Problem List:  1. Hair loss, clinically consistent with androgenetic alopecia  - Current tx: oral minoxidil 2.5 mg daily  2. Keloid scarring, posterior shoulders s/p ILK 9/9/2022  3. Acne vulgaris, scarring, chest and back   - Current tx: doxycycline 100 mg BID, OTC salicyclic acid wash or sulfur wash daily  - Previous tx: BPO wash (doesn't like)  - Future considerations: isotretinoin  ____________________________________________    Assessment & Plan:     # Acne, scarring, chest and back. Not fully controlled.  Reviewed current treatment regimen and potential decrease of doxycycline to anti-inflammatory dose. Reviewed isotretinoin mechanism and side effects.  - Continue doxycycline 100 mg BID  - Continue OTC salicyclic acid wash or sulfur wash daily  - Consider isotretinoin in 3 months    # Hair loss, clinically consistent with androgenetic alopecia  - Continue oral minoxidil 2.5 mg daily. Discussed side effects including postural hypotension. Discussed that if he does experience this side effect he should let us know and we can decrease the dose to 1.25 mg daily    # Keloid scarring, bilateral posterior shoulders  - ILK today; see procedure note below    Procedures Performed:   - Intra-lesional triamcinolone procedure note. After verbal consent and review of risk of pain and skin thinning/atrophy, positioning and cleansing with isopropyl alcohol, 1 total mL of triamcinolone 40 mg/mL was injected into 10 lesion(s) on the right posterior shoulder. The patient tolerated the procedure well and left the dermatology clinic in good condition.    Follow-up: 3 months in person, or sooner for new or changing lesions.    Staff attestation:  The documentation recorded by the scribe accurately reflects the services I personally performed and the decisions I personally made. I have made edits where needed.    Davis Onofre MD  Staff  Dermatologist and Dermatopathologist  , Department of Dermatology    ____________________________________________    CC: Acne (F/U meds. Possible start to accutane ) and Hair Loss (F/U needs med refill )      HPI:  Mr. Aram Hadley is a(n) extremely pleasant 32 year old male who presents today as a return patient for acne and hair loss. Last seen in dermatology by me on 9/9/2022, at which time patient was started on minoxidil 2.5 mg daily for treatment of hair loss and doxycycline 100 mg BID for treatment of acne vulgaris.    Today, patient endorses some GI sensitivity to doxycycline, but nothing major. He sometimes forgets to take his nightly dose. He still experiences some breakouts on occasion. Keloids responded significantly to ILK. His hair loss is improving on oral minoxidil.    The patient denies any painful, bleeding, or nonhealing lesions, or any new or changing moles.    Patient is otherwise feeling well, without additional skin concerns.     Labs Reviewed:  N/A    Physical Exam:  Vitals: There were no vitals taken for this visit.  SKIN: Focused examination of the face and bilateral shoulders was performed.  - Keloidal scarring on the bilateral posterior shoulders.  - Few scattered mild ice pick scars from prior acne on cheeks and forehead. Face is clear of new spots.  - Increasing hair density and with no new thinning on bitemporal and vertex scalp. No scalp scale.   - No other lesions of concern on areas examined.     Medications:  Current Outpatient Medications   Medication     amphetamine-dextroamphetamine (ADDERALL) 10 MG tablet     doxycycline monohydrate (MONODOX) 100 MG capsule     minoxidil (LONITEN) 2.5 MG tablet     propranolol (INDERAL) 20 MG tablet     No current facility-administered medications for this visit.      Past Medical History:   Patient Active Problem List   Diagnosis     Anxiety     Family history of premature coronary heart disease     Left knee pain,  unspecified chronicity     Past Medical History:   Diagnosis Date     Anxiety      Attention deficit hyperactivity disorder (ADHD), predominantly inattentive type        CC No referring provider defined for this encounter. on close of this encounter.    This note has been created using voice recognition software; while it has been reviewed, some errors may persist.

## 2022-12-09 ENCOUNTER — OFFICE VISIT (OUTPATIENT)
Dept: FAMILY MEDICINE | Facility: CLINIC | Age: 32
End: 2022-12-09
Payer: COMMERCIAL

## 2022-12-09 DIAGNOSIS — L70.0 ACNE VULGARIS: Primary | ICD-10-CM

## 2022-12-09 DIAGNOSIS — L65.9 HAIR LOSS: ICD-10-CM

## 2022-12-09 PROCEDURE — 11900 INJECT SKIN LESIONS </W 7: CPT | Performed by: DERMATOLOGY

## 2022-12-09 PROCEDURE — 99214 OFFICE O/P EST MOD 30 MIN: CPT | Mod: 25 | Performed by: DERMATOLOGY

## 2022-12-09 RX ORDER — TRIAMCINOLONE ACETONIDE 40 MG/ML
40 INJECTION, SUSPENSION INTRA-ARTICULAR; INTRAMUSCULAR ONCE
Status: COMPLETED | OUTPATIENT
Start: 2022-12-09 | End: 2022-12-09

## 2022-12-09 RX ADMIN — TRIAMCINOLONE ACETONIDE 40 MG: 40 INJECTION, SUSPENSION INTRA-ARTICULAR; INTRAMUSCULAR at 14:24

## 2022-12-09 ASSESSMENT — PAIN SCALES - GENERAL: PAINLEVEL: NO PAIN (0)

## 2022-12-09 NOTE — LETTER
12/9/2022         RE: Aram Hadley  [address]  [place] [facility] [address]        Dear Colleague,    Thank you for referring your patient, Aram Hadley, to the [facility]. Please see a copy of my visit note below.    [facility] Dermatology Clinic Note - EP    Encounter Date: Dec 9, 2022    Dermatology Problem List:  1. Hair loss, clinically consistent with androgenetic alopecia  - Current tx: oral minoxidil 2.5 mg daily  2. Keloid scarring, posterior shoulders s/p ILK 9/9/2022  3. Acne vulgaris, scarring, chest and back   - Current tx: doxycycline 100 mg BID, OTC salicyclic acid wash or sulfur wash daily  - Previous tx: BPO wash (doesn't like)  - Future considerations: isotretinoin  ____________________________________________    Assessment & Plan:     # Acne, scarring, chest and back. Not fully controlled.  Reviewed current treatment regimen and potential decrease of doxycycline to anti-inflammatory dose. Reviewed isotretinoin mechanism and side effects.  - Continue doxycycline 100 mg BID  - Continue OTC salicyclic acid wash or sulfur wash daily  - Consider isotretinoin in 3 months    # Hair loss, clinically consistent with androgenetic alopecia  - Continue oral minoxidil 2.5 mg daily. Discussed side effects including postural hypotension. Discussed that if he does experience this side effect he should let us know and we can decrease the dose to 1.25 mg daily    # Keloid scarring, bilateral posterior shoulders  - ILK today; see procedure note below    Procedures Performed:   - Intra-lesional triamcinolone procedure note. After verbal consent and review of risk of pain and skin thinning/atrophy, positioning and cleansing with isopropyl alcohol, 1 total mL of triamcinolone 40 mg/mL was injected into 10 lesion(s) on the right posterior shoulder. The patient tolerated the procedure well and left the dermatology clinic in good condition.    Follow-up: 3 months in person, or sooner for  new or changing lesions.    Staff attestation:  The documentation recorded by the scribe accurately reflects the services I personally performed and the decisions I personally made. I have made edits where needed.    Davis Onofre MD  Staff Dermatologist and Dermatopathologist  , Department of Dermatology    ____________________________________________    CC: Acne (F/U meds. Possible start to accutane ) and Hair Loss (F/U needs med refill )      HPI:  Mr. Aram Hadley is a(n) extremely pleasant 32 year old male who presents today as a return patient for acne and hair loss. Last seen in dermatology by me on 9/9/2022, at which time patient was started on minoxidil 2.5 mg daily for treatment of hair loss and doxycycline 100 mg BID for treatment of acne vulgaris.    Today, patient endorses some GI sensitivity to doxycycline, but nothing major. He sometimes forgets to take his nightly dose. He still experiences some breakouts on occasion. Keloids responded significantly to ILK. His hair loss is improving on oral minoxidil.    The patient denies any painful, bleeding, or nonhealing lesions, or any new or changing moles.    Patient is otherwise feeling well, without additional skin concerns.     Labs Reviewed:  N/A    Physical Exam:  Vitals: There were no vitals taken for this visit.  SKIN: Focused examination of the face and bilateral shoulders was performed.  - Keloidal scarring on the bilateral posterior shoulders.  - Few scattered mild ice pick scars from prior acne on cheeks and forehead. Face is clear of new spots.  - Increasing hair density and with no new thinning on bitemporal and vertex scalp. No scalp scale.   - No other lesions of concern on areas examined.     Medications:  Current Outpatient Medications   Medication     amphetamine-dextroamphetamine (ADDERALL) 10 MG tablet     doxycycline monohydrate (MONODOX) 100 MG capsule     minoxidil (LONITEN) 2.5 MG tablet     propranolol  (INDERAL) 20 MG tablet     No current facility-administered medications for this visit.      Past Medical History:   Patient Active Problem List   Diagnosis     Anxiety     Family history of premature coronary heart disease     Left knee pain, unspecified chronicity     Past Medical History:   Diagnosis Date     Anxiety      Attention deficit hyperactivity disorder (ADHD), predominantly inattentive type        CC No referring provider defined for this encounter. on close of this encounter.    This note has been created using voice recognition software; while it has been reviewed, some errors may persist.       Again, thank you for allowing me to participate in the care of your patient.        Sincerely,        Davis Onofre MD

## 2022-12-12 DIAGNOSIS — L70.0 ACNE VULGARIS: ICD-10-CM

## 2022-12-14 RX ORDER — DOXYCYCLINE 100 MG/1
CAPSULE ORAL
Qty: 180 CAPSULE | Refills: 0 | Status: SHIPPED | OUTPATIENT
Start: 2022-12-14 | End: 2023-03-15

## 2022-12-14 NOTE — TELEPHONE ENCOUNTER
Prescription approved per [facility] Refill Protocol.  Taking for acne.    Ximena BENITES RN  [facility]at[facility] [place]  [phone]

## 2022-12-16 NOTE — TELEPHONE ENCOUNTER
Routing refill request to provider for review/approval because:  Drug not on the [facility] refill protocol     Ximena BENITES RN  [facility] Dermatology [place]  [phone]

## 2022-12-21 NOTE — TELEPHONE ENCOUNTER
Pt states he is still waiting for his doxycycline monohydrate (MONODOX) 100 MG capsule [04702] to be refilled and sent to pharmacy. Please call Pt back for any further discussion, thanks.

## 2022-12-23 RX ORDER — MINOXIDIL 2.5 MG/1
TABLET ORAL
Qty: 90 TABLET | Refills: 0 | Status: SHIPPED | OUTPATIENT
Start: 2022-12-23 | End: 2022-12-29

## 2022-12-29 DIAGNOSIS — L65.9 HAIR LOSS: ICD-10-CM

## 2022-12-29 RX ORDER — MINOXIDIL 2.5 MG/1
TABLET ORAL
Qty: 90 TABLET | Refills: 0 | Status: SHIPPED | OUTPATIENT
Start: 2022-12-29 | End: 2023-04-18

## 2022-12-29 NOTE — TELEPHONE ENCOUNTER
Prescription approved per [facility] Refill Protocol.    Ximena BENITES RN  [facility] Dermatology [place]  [phone]

## 2023-03-12 DIAGNOSIS — L70.0 ACNE VULGARIS: ICD-10-CM

## 2023-03-15 NOTE — TELEPHONE ENCOUNTER
doxycycline monohydrate (MONODOX) 100 MG capsule       Last Written Prescription Date:    Last Fill Quantity: 180,   # refills: 0  Last Office Visit : 12-  Future Office visit:  Not on file    Routing refill request to provider for review/approval because:  Drug not on the [facility] refill protocol or controlled substance

## 2023-03-20 RX ORDER — DOXYCYCLINE 100 MG/1
100 CAPSULE ORAL 2 TIMES DAILY
Qty: 180 CAPSULE | Refills: 1 | Status: SHIPPED | OUTPATIENT
Start: 2023-03-20

## 2023-03-20 NOTE — TELEPHONE ENCOUNTER
Given an upcoming ELVER, gave 6 months to bridge until can be seen in the fall. No additional refills until is seen in clinic, thanks!

## 2023-03-20 NOTE — TELEPHONE ENCOUNTER
Sent [employer] message to scheduled follow up.    Thank you,  Svitlana VINSON RN  Dermatology   [phone]

## 2023-03-22 NOTE — TELEPHONE ENCOUNTER
Detailed message left with info from provider and return number [phone] to call to schedule next available with Dr. Onofre.    Ximena BENITES RN  [facility] Dermatology [place]  [phone]

## 2023-04-14 NOTE — TELEPHONE ENCOUNTER
Spoke to pt, informed pt that Rx had been sent to preferred pharmacy.  Pt will call back if any issues picking up Rx.    Lula DOUGLAS    [facility]

## 2023-04-18 DIAGNOSIS — L65.9 HAIR LOSS: ICD-10-CM

## 2023-04-18 NOTE — TELEPHONE ENCOUNTER
[facility] Call Center    Phone Message    May a detailed message be left on voicemail: yes     Reason for Call: Medication Question or concern regarding medication   Prescription Clarification  Name of Medication: minoxidil (LONITEN) 2.5 MG tablet  Prescribing Provider: Dr. Onofre   Pharmacy: [facility] [id number] [place] [facility] [place] [place] - [address]   What on the order needs clarification? Pt's luna Raya states that when they try to refill the minoxidil (LONITEN) 2.5 MG tablet there are always issues and it doesn't go through.     Dorota states a refill was requested at the same time as the doxycycline and that went through but the minoxidil didn't.     Please call Aram back to discuss. Thank you.     Action Taken: Other: [facility]    Travel Screening: Not Applicable

## 2023-04-18 NOTE — TELEPHONE ENCOUNTER
Pt called back and states the pharmacy told him they sent in 2 refill requests for the minoxidil but never heard from the clinic.  States refill request was sent last week and back in March.  Nurse advised only received the refill request for the doxycycline in March. Advised did not receive refill request for minoxidil.    Rx and pharmacy pended.  Routing refill request to provider for review/approval because:  Drug not on the [facility] refill protocol     Ximena BENITES RN  [facility] Dermatology [place]  [phone]

## 2023-04-18 NOTE — TELEPHONE ENCOUNTER
Left message for pt to call nurse at [phone].  No record of refill request for minoxidil.    Ximena BENITES RN  [facility] Dermatology [place]  [phone]

## 2023-04-21 RX ORDER — MINOXIDIL 2.5 MG/1
2.5 TABLET ORAL DAILY
Qty: 90 TABLET | Refills: 1 | Status: SHIPPED | OUTPATIENT
Start: 2023-04-21 | End: 2023-10-31

## 2023-05-08 ENCOUNTER — HEALTH MAINTENANCE LETTER (OUTPATIENT)
Age: 33
End: 2023-05-08

## 2023-10-02 ENCOUNTER — OFFICE VISIT (OUTPATIENT)
Dept: FAMILY MEDICINE | Facility: CLINIC | Age: 33
End: 2023-10-02
Payer: COMMERCIAL

## 2023-10-02 VITALS
TEMPERATURE: 97.7 F | HEART RATE: 84 BPM | SYSTOLIC BLOOD PRESSURE: 134 MMHG | BODY MASS INDEX: 27.78 KG/M2 | OXYGEN SATURATION: 95 % | DIASTOLIC BLOOD PRESSURE: 83 MMHG | WEIGHT: 202 LBS

## 2023-10-02 DIAGNOSIS — M54.6 BILATERAL THORACIC BACK PAIN, UNSPECIFIED CHRONICITY: Primary | ICD-10-CM

## 2023-10-02 NOTE — PATIENT INSTRUCTIONS
-  ASSESSMENT and PLAN:    - Aram is a 31 yo with lower thoracic and upper lumbar pain that is likely from deconditioning.   Will obtain X-rays. Call [phone] or [phone] to schedule imaging tests at the [facility].   Also, sent to physical therapy  Discussed biomechanics and recommended active rehabilitation including use of a standing desk with an active body stance.         Follow-up if no improvement in 6-8 weeks or sooner if worse    Landon Cox MD  Family Medicine and Sports Medicine  [facility]

## 2023-10-02 NOTE — NURSING NOTE
32 year old  Chief Complaint   Patient presents with    Back Pain     X 2 months. Middle to lower back. Happens when sleeping and effects sleeping. Tried flexeril but didn't help. Stretching hasn't helped.        Blood pressure 134/83, pulse 84, temperature 97.7  F (36.5  C), temperature source Skin, weight 91.6 kg (202 lb), SpO2 95 %. Body mass index is 27.78 kg/m .  Patient Active Problem List   Diagnosis    Anxiety    Family history of premature coronary heart disease    Left knee pain, unspecified chronicity       Wt Readings from Last 2 Encounters:   10/02/23 91.6 kg (202 lb)   04/13/22 78.8 kg (173 lb 12 oz)     BP Readings from Last 3 Encounters:   10/02/23 134/83   10/22/22 (!) 132/98   04/13/22 112/73         Current Outpatient Medications   Medication    amphetamine-dextroamphetamine (ADDERALL) 10 MG tablet    doxycycline monohydrate (MONODOX) 100 MG capsule    minoxidil (LONITEN) 2.5 MG tablet    propranolol (INDERAL) 20 MG tablet     No current facility-administered medications for this visit.       Social History     Tobacco Use    Smoking status: Never    Smokeless tobacco: Never   Substance Use Topics    Alcohol use: Yes     Comment: 2-3 drinks 1-2 times a week    Drug use: Not Currently       Health Maintenance Due   Topic Date Due    ADVANCE CARE PLANNING  Never done    HEPATITIS B IMMUNIZATION (1 of 3 - 3-dose series) Never done    YEARLY PREVENTIVE VISIT  02/02/2022    INFLUENZA VACCINE (1) 09/01/2023    COVID-19 Vaccine (6 - 2023-24 season) 09/01/2023       No results found for: PAP      October 2, 2023 10:01 AM

## 2023-10-02 NOTE — PROGRESS NOTES
Medical assistant intake:  Aram Hadley is a 32 year old male who presents to [facility] today for Back Pain (X 2 months. Middle to lower back. Happens when sleeping and effects sleeping. Tried flexeril but didn't help. Stretching hasn't helped. )       -  ASSESSMENT and PLAN:    - Aram is a 31 yo with lower thoracic and upper lumbar pain that is likely from deconditioning.   Will obtain X-rays. Call [phone] or [phone] to schedule imaging tests at the [facility].   Also, sent to physical therapy  Discussed biomechanics and recommended active rehabilitation including use of a standing desk with an active body stance.         Follow-up if no improvement in 6-8 weeks or sooner if worse    Landon Cox MD  Family Medicine and Sports Medicine  [facility]      SUBJECTIVE:   Aram is here with back pain.   States that he's been far less active than usual over the past year and gets intermittent back spasms.   About 2 months ago, started having back spasms with sleep. Has tried his usual Flexeril but without benefit.     Review Of Systems:    See subjective.   Has otherwise been in usual state of health, e.g. no fevers or other symptoms of illness  Cardiovascular: negative  Respiratory: No shortness of breath, dyspnea on exertion, cough, or hemoptysis  Gastrointestinal: negative  Genitourinary: negative    Problem list per EMR:  Patient Active Problem List   Diagnosis    Anxiety    Family history of premature coronary heart disease    Left knee pain, unspecified chronicity       Current Outpatient Medications   Medication Sig Dispense Refill    amphetamine-dextroamphetamine (ADDERALL) 10 MG tablet Take 10 mg by mouth every evening      doxycycline monohydrate (MONODOX) 100 MG capsule Take 1 capsule (100 mg) by mouth 2 times daily 180 capsule 1    minoxidil (LONITEN) 2.5 MG tablet Take 1 tablet (2.5 mg) by mouth daily 90 tablet 1    propranolol (INDERAL) 20 MG tablet  Take one tablet by mouth as needed for anxiety. May repeat once in a day. 30 tablet 3       No Known Allergies     Social:   Working at a [employer] and working about 12-14 hours/day.     OBJECTIVE    Vitals: /83 (BP Location: Right arm, Patient Position: Sitting, Cuff Size: Adult Large)   Pulse 84   Temp 97.7  F (36.5  C) (Skin)   Wt 91.6 kg (202 lb)   SpO2 95%   BMI 27.78 kg/m    BMI= Body mass index is 27.78 kg/m .   he appears well and in no distress.  Able to rise from a seated position without difficulty.  He is able to do repeated heel raises and squats.  He has no weakness in the lower extremities.    Area of discomfort is in the lower thoracic and upper lumbar region from approximately thoracic Spine #10 down to lumbar spine #2.  the discomfort is lateral of midline on both sides.  There is no distinct spinous tenderness.  He has no redness.  He can forward bend and touch his toes.  There is no rash noted.    His gait is normal.    SEE TOP OF NOTE FOR ASSESSMENT AND PLAN    --Landon Cox MD  [facility], Department of Family Medicine and Community Health

## 2024-02-07 DIAGNOSIS — L65.9 HAIR LOSS: ICD-10-CM

## 2024-02-07 NOTE — TELEPHONE ENCOUNTER
Minoxidil 2.5 mg  Last Written Prescription Date:  10/31/23  Last Fill Quantity: 90,  # refills: 0   Last office visit: 12/9/2022 ; last virtual visit: Visit date not found with prescribing provider:  Jemima   Future Office Visit:  6/14/24    Routing refill request to provider for review/approval because:  Drug not on the [facility] refill protocol   Pt has appt scheduled for 6/14/24    Ximena BENITES RN  [facility] [place]  [phone]

## 2024-02-09 RX ORDER — MINOXIDIL 2.5 MG/1
2.5 TABLET ORAL DAILY
Qty: 90 TABLET | Refills: 0 | Status: SHIPPED | OUTPATIENT
Start: 2024-02-09 | End: 2024-03-13

## 2024-03-13 ENCOUNTER — TELEPHONE (OUTPATIENT)
Dept: FAMILY MEDICINE | Facility: CLINIC | Age: 34
End: 2024-03-13
Payer: COMMERCIAL

## 2024-03-13 DIAGNOSIS — L65.9 HAIR LOSS: ICD-10-CM

## 2024-03-13 RX ORDER — MINOXIDIL 2.5 MG/1
2.5 TABLET ORAL DAILY
Qty: 90 TABLET | Refills: 0 | Status: SHIPPED | OUTPATIENT
Start: 2024-03-13 | End: 2024-06-18

## 2024-03-13 NOTE — TELEPHONE ENCOUNTER
Pt is out of town and needs a refill on minoxidil.  Rx for 90 days was sent to local pharmacy on 2/9/24 per Dr. Onofre.  Nurse sent 90 day supply to new pharmacy.  Pt has appt in August with Dr. Onofre at [facility].    Ximena BENITES RN  [facility] [place]  [phone]

## 2024-03-13 NOTE — CONFIDENTIAL NOTE
[facility] Call Center    Phone Message    May a detailed message be left on voicemail: yes     Reason for Call: Other: Please send refill of minoxidil (LONITEN) 2.5 MG tablet to [facility] [address] [place] [address] as he is currently [place] of state for some months. Okay to leave a voicemail     Action Taken: Message routed to:  Other: [facility]    Travel Screening: Not Applicable

## 2024-03-13 NOTE — TELEPHONE ENCOUNTER
Judy Sofia25 minutes ago (10:06 AM)     SB  [facility] Call Center     Phone Message     May a detailed message be left on voicemail: yes      Reason for Call: Other: Please send refill of minoxidil (LONITEN) 2.5 MG tablet to [facility] [address] [place] [address] as he is currently out of state for some months. Okay to leave a voicemail      Action Taken: Message routed to:  Other: [facility]     Travel Screening: Not Applicable

## 2024-05-11 ENCOUNTER — HEALTH MAINTENANCE LETTER (OUTPATIENT)
Age: 34
End: 2024-05-11

## 2024-06-17 DIAGNOSIS — L65.9 HAIR LOSS: ICD-10-CM

## 2024-06-18 RX ORDER — MINOXIDIL 2.5 MG/1
2.5 TABLET ORAL DAILY
Qty: 90 TABLET | Refills: 0 | Status: SHIPPED | OUTPATIENT
Start: 2024-06-18

## 2024-06-18 NOTE — TELEPHONE ENCOUNTER
Routing refill request to provider for review/approval because:  Drug not on the [facility] refill protocol     Ximena BENITES RN  [facility] Dermatology [place]  [phone]

## 2024-07-09 ENCOUNTER — TELEPHONE (OUTPATIENT)
Dept: DERMATOLOGY | Facility: CLINIC | Age: 34
End: 2024-07-09
Payer: COMMERCIAL

## 2024-07-09 NOTE — TELEPHONE ENCOUNTER
Left Voicemail (1st Attempt) and Sent Mychart (1st Attempt) for the patient to call back and schedule the following:    Appointment type: Return   Provider: Dr. Onofre   Return date: 8/6/24  Specialty phone number: [phone]

## 2024-07-10 ENCOUNTER — TELEPHONE (OUTPATIENT)
Dept: DERMATOLOGY | Facility: CLINIC | Age: 34
End: 2024-07-10
Payer: COMMERCIAL

## 2024-10-28 ENCOUNTER — TELEPHONE (OUTPATIENT)
Dept: DERMATOLOGY | Facility: CLINIC | Age: 34
End: 2024-10-28
Payer: COMMERCIAL

## 2024-10-28 DIAGNOSIS — L65.9 HAIR LOSS: ICD-10-CM

## 2024-10-28 NOTE — TELEPHONE ENCOUNTER
[facility] Call Center    Phone Message    May a detailed message be left on voicemail: yes     Reason for Call: Medication Refill Request    Has the patient contacted the pharmacy for the refill? Yes   Name of medication being requested: minoxidil (LONITEN) 2.5 MG tablet [[id number]] (Order [id number])   Provider who prescribed the medication: Dr. Onofre  Pharmacy:   Date medication is needed: asap,   Pt advised they realized as they were packing to travel that they were running low on this medication, pt also advised that they have moved out of [place], asking if Dr. Onofre can refill this until they get established with another provider?     Action Taken: Message routed to:  [facility] Derm    Travel Screening: Not Applicable     Date of Service:                                                                      [Normal] : Normal [Brushing teeth] : Brushing teeth [Car seat in back seat] : Car seat in back seat [de-identified] : well balanced and varied

## 2024-10-29 RX ORDER — MINOXIDIL 2.5 MG/1
2.5 TABLET ORAL DAILY
Qty: 90 TABLET | Refills: 3 | Status: SHIPPED | OUTPATIENT
Start: 2024-10-29 | End: 2024-11-07

## 2024-10-30 NOTE — TELEPHONE ENCOUNTER
Ad[facility]. Aram has no questions and does not need anything else right now.     Yumiko Luis CA

## 2024-11-07 NOTE — TELEPHONE ENCOUNTER
[facility] Call Center    Phone Message    May a detailed message be left on voicemail: yes     Reason for Call: Other: Pt is asking if this can be sent to a physical pharmacy rather than [facility], he is asking for [facility]- [address] [place] [address]     Action Taken: Message routed to:  [facility] Derm    Travel Screening: Not Applicable     Date of Service:

## 2025-05-17 ENCOUNTER — HEALTH MAINTENANCE LETTER (OUTPATIENT)
Age: 35
End: 2025-05-17